# Patient Record
Sex: MALE
[De-identification: names, ages, dates, MRNs, and addresses within clinical notes are randomized per-mention and may not be internally consistent; named-entity substitution may affect disease eponyms.]

---

## 2019-12-31 PROBLEM — Z00.00 ENCOUNTER FOR PREVENTIVE HEALTH EXAMINATION: Status: ACTIVE | Noted: 2019-12-31

## 2022-03-18 ENCOUNTER — APPOINTMENT (OUTPATIENT)
Dept: NEUROLOGY | Facility: CLINIC | Age: 47
End: 2022-03-18

## 2023-01-21 ENCOUNTER — NON-APPOINTMENT (OUTPATIENT)
Age: 48
End: 2023-01-21

## 2023-02-14 ENCOUNTER — APPOINTMENT (OUTPATIENT)
Dept: UROLOGY | Facility: CLINIC | Age: 48
End: 2023-02-14
Payer: COMMERCIAL

## 2023-02-14 VITALS
DIASTOLIC BLOOD PRESSURE: 90 MMHG | HEART RATE: 73 BPM | HEIGHT: 72 IN | WEIGHT: 240 LBS | BODY MASS INDEX: 32.51 KG/M2 | SYSTOLIC BLOOD PRESSURE: 148 MMHG | RESPIRATION RATE: 14 BRPM | OXYGEN SATURATION: 99 % | TEMPERATURE: 98 F

## 2023-02-14 DIAGNOSIS — R39.198 OTHER DIFFICULTIES WITH MICTURITION: ICD-10-CM

## 2023-02-14 DIAGNOSIS — Z78.9 OTHER SPECIFIED HEALTH STATUS: ICD-10-CM

## 2023-02-14 DIAGNOSIS — R35.0 FREQUENCY OF MICTURITION: ICD-10-CM

## 2023-02-14 LAB
BILIRUB UR QL STRIP: NORMAL
COLLECTION METHOD: NORMAL
GLUCOSE UR-MCNC: NORMAL
HCG UR QL: 0.2 EU/DL
HGB UR QL STRIP.AUTO: NORMAL
KETONES UR-MCNC: NORMAL
LEUKOCYTE ESTERASE UR QL STRIP: NORMAL
NITRITE UR QL STRIP: NORMAL
PH UR STRIP: 6
PROT UR STRIP-MCNC: NORMAL
SP GR UR STRIP: 1.03

## 2023-02-14 PROCEDURE — 99204 OFFICE O/P NEW MOD 45 MIN: CPT

## 2023-02-14 RX ORDER — ALFUZOSIN HYDROCHLORIDE 10 MG/1
10 TABLET, EXTENDED RELEASE ORAL DAILY
Qty: 90 | Refills: 3 | Status: ACTIVE | COMMUNITY
Start: 2023-02-14 | End: 1900-01-01

## 2023-02-14 RX ORDER — ESCITALOPRAM OXALATE 20 MG/1
20 TABLET ORAL
Qty: 30 | Refills: 0 | Status: ACTIVE | COMMUNITY
Start: 2023-01-30

## 2023-02-14 RX ORDER — APIXABAN 5 MG/1
TABLET, FILM COATED ORAL
Refills: 0 | Status: ACTIVE | COMMUNITY

## 2023-02-14 RX ORDER — SYRINGE WITH NEEDLE, 1 ML 25GX5/8"
25G X 1" SYRINGE, EMPTY DISPOSABLE MISCELLANEOUS
Qty: 10 | Refills: 0 | Status: ACTIVE | COMMUNITY
Start: 2023-01-05

## 2023-02-14 RX ORDER — ALPRAZOLAM 0.5 MG/1
0.5 TABLET ORAL
Qty: 90 | Refills: 0 | Status: ACTIVE | COMMUNITY
Start: 2023-01-30

## 2023-02-14 RX ORDER — CIPROFLOXACIN HYDROCHLORIDE 500 MG/1
500 TABLET, FILM COATED ORAL
Qty: 20 | Refills: 0 | Status: ACTIVE | COMMUNITY
Start: 2023-01-22

## 2023-02-14 RX ORDER — TESTOSTERONE CYPIONATE 200 MG/ML
200 INJECTION, SOLUTION INTRAMUSCULAR
Qty: 10 | Refills: 0 | Status: ACTIVE | COMMUNITY
Start: 2023-01-05

## 2023-02-14 RX ORDER — NEEDLES, DISPOSABLE 25GX5/8"
18G X 1" NEEDLE, DISPOSABLE MISCELLANEOUS
Qty: 10 | Refills: 0 | Status: ACTIVE | COMMUNITY
Start: 2023-01-05

## 2023-02-14 NOTE — HISTORY OF PRESENT ILLNESS
[FreeTextEntry1] : 48 y/o male who complains of incomplete emptying at times and a slow stream that has gradually gotten slower over the past few years. He presented to urgent care and apparently had an UA that was negative for any infections. He denies any gross hematuria, dysuria, prior UTIs, or stone disease. \par \par pmxh: DVT, PE and placed on Eliquis \par \par pshx: back surgery and fusion L4-S1\par \par allergies: penicillin \par \par social hx: pt is a  and has used some sort of testosterone supplements \par \par family hx: father had prostate cancer \par

## 2023-02-14 NOTE — ASSESSMENT
[FreeTextEntry1] : 48 y/o male with LUTS and a slow stream. We discussed this in detail. Pt has a family hx of prostate cancer and has taken testosterone supplements of some sort. It is unclear what is in these supplements. He also has a hx of back issues with a previous fusion. We discussed that all of these factors can contribute to his voiding dysfunction. A PSA was obtained today and I started him on a trial of Alfuzosin. We discussed that if his PSA is elevated, he may need MRIs and biopsies. Pt understands and will follow up in 6 weeks with a flow rate and PVR. All his questions were otherwise answered. Total time=45 min.\par \par The submitted E/M billing level for this visit reflects the total time spent on the day of the visit including face-to-face time spent with the patient, non-face-to-face review of medical records and relevant information, documentation, and asynchronous communication with the patient after a visit via phone, email, or patient’s EHR portal after the visit. \par The medical records reviewed are either scanned into the chart or reviewed with the patient using a patient’s electronic medical records portal for patients with records not available to [facility] via electronic transmission platforms from other institutions and labs. \par Time spend counseling and performing coordination of care was also included in determining the appropriate EM billing level.\par \par I have reviewed and verified information regarding the chief complaint and history recorded by the ancillary staff and/or the patient. I have independently reviewed and interpreted tests performed by other physicians and facilities as necessary. \par \par I have discussed with the patient differential diagnosis, reason for auxiliary tests if ordered, risks, benefits, alternatives, and complications of each form of therapy were discussed.\par

## 2023-02-14 NOTE — PHYSICAL EXAM
[General Appearance - Well Nourished] : well nourished [Normal Appearance] : normal appearance [General Appearance - In No Acute Distress] : no acute distress [Oriented To Time, Place, And Person] : oriented to person, place, and time [Affect] : the affect was normal [Abdomen Soft] : soft [Abdomen Tenderness] : non-tender [Abdomen Mass (___ Cm)] : no abdominal mass palpated [Costovertebral Angle Tenderness] : no ~M costovertebral angle tenderness [Urethral Meatus] : meatus normal [Penis Abnormality] : normal circumcised penis [Epididymis] : the epididymides were normal [Testes Tenderness] : no tenderness of the testes [Testes Mass (___cm)] : there were no testicular masses [Prostate Tenderness] : the prostate was not tender [Prostate Size ___ gm] : prostate size [unfilled] gm [FreeTextEntry1] : testes are slightly smaller than normal. external hemorrhoids noted. no hard nodules, but soft nodules were noted on the prostate.

## 2023-02-16 ENCOUNTER — NON-APPOINTMENT (OUTPATIENT)
Age: 48
End: 2023-02-16

## 2023-02-16 LAB — PSA SERPL-MCNC: 1.51 NG/ML

## 2023-03-28 ENCOUNTER — APPOINTMENT (OUTPATIENT)
Dept: UROLOGY | Facility: CLINIC | Age: 48
End: 2023-03-28

## 2024-03-21 ENCOUNTER — APPOINTMENT (OUTPATIENT)
Dept: OTOLARYNGOLOGY | Facility: CLINIC | Age: 49
End: 2024-03-21
Payer: COMMERCIAL

## 2024-03-21 VITALS — BODY MASS INDEX: 31.14 KG/M2 | HEIGHT: 73 IN | WEIGHT: 235 LBS

## 2024-03-21 PROCEDURE — 31575 DIAGNOSTIC LARYNGOSCOPY: CPT

## 2024-03-21 PROCEDURE — 99204 OFFICE O/P NEW MOD 45 MIN: CPT | Mod: 25

## 2024-03-21 RX ORDER — PANTOPRAZOLE 40 MG/1
40 TABLET, DELAYED RELEASE ORAL
Qty: 30 | Refills: 3 | Status: ACTIVE | COMMUNITY
Start: 2024-03-21 | End: 1900-01-01

## 2024-03-21 RX ORDER — FAMOTIDINE 40 MG/1
40 TABLET, FILM COATED ORAL
Qty: 30 | Refills: 3 | Status: ACTIVE | COMMUNITY
Start: 2024-03-21 | End: 1900-01-01

## 2024-03-21 NOTE — REASON FOR VISIT
[Initial Evaluation] : an initial evaluation for [FreeTextEntry2] :  left side muffled hearing , left ear otalgia , left side tongue pain, left side headaches, left side throat pain , left eye pressure

## 2024-03-21 NOTE — HISTORY OF PRESENT ILLNESS
[de-identified] : Patient presents today c/o  left side muffled hearing , left ear otalgia , left side tongue pain, left side headaches, left side throat pain , left eye pressure. Pt has stabbing adn pressure pain in his left tongue and palate. Pain is constant also in the throat. Pt has headache on his scalp and behind the ear. Pt has orbital pressure as well. Pt had biopsy of the tongue which was normal. Pt has difficult with sleeping and has energy level decrease. Pt has been unable to control with otc pain meds.

## 2024-03-21 NOTE — PHYSICAL EXAM
[Nasal Endoscopy Performed] : nasal endoscopy was performed, see procedure section for findings [de-identified] : edema [Midline] : trachea located in midline position [Normal] : mucosa is normal

## 2024-03-26 RX ORDER — BUTALBITAL, ACETAMINOPHEN AND CAFFEINE 300; 50; 40 MG/1; MG/1; MG/1
50-300-40 CAPSULE ORAL
Qty: 18 | Refills: 0 | Status: ACTIVE | COMMUNITY
Start: 2024-03-26 | End: 1900-01-01

## 2024-04-04 ENCOUNTER — APPOINTMENT (OUTPATIENT)
Dept: OTOLARYNGOLOGY | Facility: CLINIC | Age: 49
End: 2024-04-04
Payer: COMMERCIAL

## 2024-04-04 DIAGNOSIS — R51.9 HEADACHE, UNSPECIFIED: ICD-10-CM

## 2024-04-04 DIAGNOSIS — H93.8X2 OTHER SPECIFIED DISORDERS OF LEFT EAR: ICD-10-CM

## 2024-04-04 DIAGNOSIS — J34.89 OTHER SPECIFIED DISORDERS OF NOSE AND NASAL SINUSES: ICD-10-CM

## 2024-04-04 DIAGNOSIS — K21.9 GASTRO-ESOPHAGEAL REFLUX DISEASE W/OUT ESOPHAGITIS: ICD-10-CM

## 2024-04-04 DIAGNOSIS — R07.0 PAIN IN THROAT: ICD-10-CM

## 2024-04-04 PROCEDURE — 31231 NASAL ENDOSCOPY DX: CPT

## 2024-04-04 PROCEDURE — 99214 OFFICE O/P EST MOD 30 MIN: CPT | Mod: 25

## 2024-04-04 RX ORDER — GABAPENTIN 100 MG/1
100 CAPSULE ORAL
Qty: 180 | Refills: 0 | Status: ACTIVE | COMMUNITY
Start: 2024-03-21 | End: 1900-01-01

## 2024-04-04 NOTE — HISTORY OF PRESENT ILLNESS
[FreeTextEntry1] : Patient returns today c/o left facial pain, frequent headaches, throat pain, LPRD. States that he is still experiencing left sided facial pain with earaches and throat pain. No issues on the right side. Using Gabapentin, Famotidine, Pantoprazole with improvement however it makes him feel tired. Burning sensation when he is eating.  Still not able to schedule to see a Neurologist. MRI 3/27/24. Has constant headaches.  Here to discuss results. Had Biopsy of tongue with [facility] before and came back negative.

## 2024-04-04 NOTE — PHYSICAL EXAM
[Nasal Endoscopy Performed] : nasal endoscopy was performed, see procedure section for findings [Normal] : mucosa is normal [Midline] : trachea located in midline position [de-identified] : cerumen impaction impaction in both ears. Removed with microsuction.

## 2024-04-04 NOTE — DATA REVIEWED
[de-identified] : MRI 1). Mild scattered paranasal sinus mucosal thickening without evidence of air-fluid levels. 2) No evidence of mass lesion or abnormal collection. No significant lymphadenopathy.

## 2024-04-04 NOTE — PROCEDURE
[FreeTextEntry6] : The following anatomic sites were directly examined in a sequential fashion: The scope was introduced in the nasal passage between the middle and inferior turbinates to exam the inferior portion of the middle meatus and the fontanelle, as well as the maxillary ostia. Next, the scope was passed medically and posteriorly to the middle turbinates to examine the sphenoethmoid recess and the superior turbinate region. [de-identified] : facail paina nd sinusitis  [Normal] : posterior cricoid area had healthy pink mucosa in the interarytenoid area and the esophageal inlet

## 2024-04-04 NOTE — REASON FOR VISIT
[Subsequent Evaluation] : a subsequent evaluation for [FreeTextEntry2] : left facial pain, frequent headaches, throat pain, LPRD

## 2024-07-15 ENCOUNTER — RX RENEWAL (OUTPATIENT)
Age: 49
End: 2024-07-15

## 2024-07-25 ENCOUNTER — APPOINTMENT (OUTPATIENT)
Age: 49
End: 2024-07-25
Payer: COMMERCIAL

## 2024-07-25 ENCOUNTER — OUTPATIENT (OUTPATIENT)
Dept: OUTPATIENT SERVICES | Facility: HOSPITAL | Age: 49
LOS: 1 days | End: 2024-07-25
Payer: COMMERCIAL

## 2024-07-25 DIAGNOSIS — I26.99 OTHER PULMONARY EMBOLISM W/OUT ACUTE COR PULMONALE: ICD-10-CM

## 2024-07-25 DIAGNOSIS — D68.59 OTHER PRIMARY THROMBOPHILIA: ICD-10-CM

## 2024-07-25 DIAGNOSIS — L98.499 NON-PRESSURE CHRONIC ULCER OF SKIN OF OTHER SITES WITH UNSPECIFIED SEVERITY: ICD-10-CM

## 2024-07-25 DIAGNOSIS — R63.4 ABNORMAL WEIGHT LOSS: ICD-10-CM

## 2024-07-25 DIAGNOSIS — B99.9 UNSPECIFIED INFECTIOUS DISEASE: ICD-10-CM

## 2024-07-25 DIAGNOSIS — I82.411 ACUTE EMBOLISM AND THROMBOSIS OF RIGHT FEMORAL VEIN: ICD-10-CM

## 2024-07-25 PROCEDURE — 99205 OFFICE O/P NEW HI 60 MIN: CPT

## 2024-07-26 DIAGNOSIS — D68.59 OTHER PRIMARY THROMBOPHILIA: ICD-10-CM

## 2024-07-26 NOTE — REASON FOR VISIT
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

## 2024-07-26 NOTE — REVIEW OF SYSTEMS
[Recent Change In Weight] : ~T recent weight change [Negative] : Integumentary [de-identified] : See H and P [FreeTextEntry1] : Recurrent infection

## 2024-07-26 NOTE — REVIEW OF SYSTEMS
[Recent Change In Weight] : ~T recent weight change [Negative] : Integumentary [de-identified] : See H and P [FreeTextEntry1] : Recurrent infection

## 2024-07-26 NOTE — HISTORY OF PRESENT ILLNESS
[de-identified] : CC: I had a blood clot in my leg that came back  He is here at the request of Dr. Hines.   This is a 48-year-old male who has been in his usual state of health until around  March of 2021 he had a DVT in right leg, it was swollen red, tender  and he was noted a have  a PE as well he was on testosterone therapy than 5 mg BID. He  saw a vascular surgeon and Eliquis was  stopped  after 6 months.  About one and a half years  2023 he felt pain again in h is groin area and swelling he saw another vascular in [place] and a new clot was suspected and he was restart on Apixaban, they don't recall a D dimer at that time.   He had a work up:  8/2023 DRVVT normal,  FVL negative,  AT 82%, Antigen 66%, low,  Protein C Activity 155, S 144,  anr beta 2glp nnegative,  anti cardiolipoin negative  HIV negative PSA 1.04 Mom, dad and siblings no clots, Maternal Aunts had clots but she had cancer. Aunt and grandfatehr  paternal and aunts maternal had a stroke.  Mom  has MS. Aunts have lupus on moms.   In 11/2023 Started to have pain in left side on his face  pain is constant can be whole left side of face. Constant earache and throat pain in midle of neck. Was seen by ENT ahd multiple work up. Had a tounge   lesion was biopsied and it has not healed. He took a re biopsy of the open would and new tissues.  He also has been following with neurologist  He sometimes gets tingling in the right thigh after the clot.   He had a infected cyst at the top of the tail bone and had a sabacous cyst infections. Two oral infection and was on antibots for a month,   He has been off testosterone replacement therapy   He has been followed by neurology. 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     Review of some of the available imaging April 2024 MRI brain without contrast no intracranial mass lesions fluid collections or infarcts.  CT pelvis 5/18/2024 3 x 1.8 x 5.8 cm peripheral enhancing fluid collection at the midline gluteal region at the level of the coccyx bone compatible with soft tissue abscess  August 2023 CT chest Stable dependent subpleural nodularity, particular along the bilateral lower lobes nonspecific most most likely due to dependent atelectasis.  Mild coronary artery calcifications  He also had a tongue lesion leukoplakia with a negative biopsy in the past   Review of blood work from [facility] July 12, 2024 ESR was 6 CRP is 20.9  Blood work from May 2024 white blood cell count is 7.4 hemoglobin is 16 platelet count is 317 CMP is normal  7/2024 BURAK 1:40,  Atni DS ab 6 high  IgA 315

## 2024-07-26 NOTE — CONSULT LETTER
[Dear  ___] : Dear  [unfilled], [Consult Letter:] : I had the pleasure of evaluating your patient, [unfilled]. [Please see my note below.] : Please see my note below. [Consult Closing:] : Thank you very much for allowing me to participate in the care of this patient.  If you have any questions, please do not hesitate to contact me. [Sincerely,] : Sincerely, [FreeTextEntry3] : Luis

## 2024-07-26 NOTE — ASSESSMENT
[FreeTextEntry1] : #DVT PE  -I do not have any of the available records or imaging but I the patient and his wife it sounds like it involves the common femoral site was quite extensive and also description of the event -I explained that it was possibly provoked by testosterone use granted some of the literature reports the incident is high within the first 3 to 6 months of use  but recent data  J Androl. 2024 Mar-Apr; 26(2): 144-154. Testosterone replacement therapy and vascular thromboembolic events: a systematic review and meta-analysis demonstrated , TRT did not influence the risk of  VTE (OR = 1.42, 95% CI: 0.22-9.03, P = 0.71),  Further studies specifically assessing the risk of DVT in men on TRT are needed.  Nevertheless its still a possibility -- Also it is reported that the recurrent event happened when he was off anticoagulation. -I recommended that if they could obtain the records of these events as well as a possible images to review here to confirm that he did indeed have a recurrent event or may be had some signs of post thrombotic syndrome? -Nevertheless I recommended they he remains on Eliquis 5 mg twice daily indefinitely I also will complete his thrombophilia testing, and also will recheck Antithrombin level to confirm the value -Based on available imaging there is no obvious signs of malignancy, and he can follow-up with you for cancer screening -if he decides to go back on Testosterone he can resume it especially since he will remain on Apixiban  # In regards to his recurrent infections, it is possible that there is a immunodeficiency although the type of infections reported in the circumstances behind the less likely nevertheless I decided to check immunoglobulins and T-cell subset  # In regards to his chronic pain which seems to be trigeminal neuralgia although other etiologies are possibility, they are concerned about autoimmunity as a cause especially since his CRP was elevated Lupus studies were positive -I will check C3-C4 rheumatoid factor ANCA and SSA values and suggested they also see a rheumatologist  -Prescription was provided and I will go to [facility] to have the labs done I will be he will call with the results and we could go from there

## 2024-07-26 NOTE — HISTORY OF PRESENT ILLNESS
[de-identified] : CC: I had a blood clot in my leg that came back  He is here at the request of Dr. Hines.   This is a 48-year-old male who has been in his usual state of health until around  March of 2021 he had a DVT in right leg, it was swollen red, tender  and he was noted a have  a PE as well he was on testosterone therapy than 5 mg BID. He  saw a vascular surgeon and Eliquis was  stopped  after 6 months.  About one and a half years  2023 he felt pain again in h is groin area and swelling he saw another vascular in [place] and a new clot was suspected and he was restart on Apixaban, they don't recall a D dimer at that time.   He had a work up:  8/2023 DRVVT normal,  FVL negative,  AT 82%, Antigen 66%, low,  Protein C Activity 155, S 144,  anr beta 2glp nnegative,  anti cardiolipoin negative  HIV negative PSA 1.04 Mom, dad and siblings no clots, Maternal Aunts had clots but she had cancer. Aunt and grandfatehr  paternal and aunts maternal had a stroke.  Mom  has MS. Aunts have lupus on moms.   In 11/2023 Started to have pain in left side on his face  pain is constant can be whole left side of face. Constant earache and throat pain in midle of neck. Was seen by ENT ahd multiple work up. Had a tounge   lesion was biopsied and it has not healed. He took a re biopsy of the open would and new tissues.  He also has been following with neurologist  He sometimes gets tingling in the right thigh after the clot.   He had a infected cyst at the top of the tail bone and had a sabacous cyst infections. Two oral infection and was on antibots for a month,   He has been off testosterone replacement therapy   He has been followed by neurology. 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     Review of some of the available imaging April 2024 MRI brain without contrast no intracranial mass lesions fluid collections or infarcts.  CT pelvis 5/18/2024 3 x 1.8 x 5.8 cm peripheral enhancing fluid collection at the midline gluteal region at the level of the coccyx bone compatible with soft tissue abscess  August 2023 CT chest Stable dependent subpleural nodularity, particular along the bilateral lower lobes nonspecific most most likely due to dependent atelectasis.  Mild coronary artery calcifications  He also had a tongue lesion leukoplakia with a negative biopsy in the past   Review of blood work from [facility] July 12, 2024 ESR was 6 CRP is 20.9  Blood work from May 2024 white blood cell count is 7.4 hemoglobin is 16 platelet count is 317 CMP is normal  7/2024 BURAK 1:40,  Atni DS ab 6 high  IgA 315

## 2024-07-26 NOTE — PHYSICAL EXAM
[Fully active, able to carry on all pre-disease performance without restriction] : Status 0 - Fully active, able to carry on all pre-disease performance without restriction [Normal] : affect appropriate [de-identified] : Muscular  [de-identified] : He has a slight asymmetry of his lipls but seems to be due to recent rebiopsy of his tounge

## 2024-07-26 NOTE — PHYSICAL EXAM
[Fully active, able to carry on all pre-disease performance without restriction] : Status 0 - Fully active, able to carry on all pre-disease performance without restriction [Normal] : affect appropriate [de-identified] : Muscular  [de-identified] : He has a slight asymmetry of his lipls but seems to be due to recent rebiopsy of his tounge

## 2024-08-07 ENCOUNTER — NON-APPOINTMENT (OUTPATIENT)
Age: 49
End: 2024-08-07